# Patient Record
Sex: FEMALE | Race: WHITE | NOT HISPANIC OR LATINO | URBAN - METROPOLITAN AREA
[De-identification: names, ages, dates, MRNs, and addresses within clinical notes are randomized per-mention and may not be internally consistent; named-entity substitution may affect disease eponyms.]

---

## 2022-05-10 ENCOUNTER — TELEPHONE (OUTPATIENT)
Dept: TRANSPLANT | Facility: CLINIC | Age: 52
End: 2022-05-10

## 2022-05-10 NOTE — TELEPHONE ENCOUNTER
Completed BREEZE questionnaire reviewed with patient.  All questions were answered and patient would like to proceed with donation process.  Informed her that an independent living donor advocate will contact her within the next 2-3 business days.  After discussion with donor advocate, blood testing can be scheduled.  All questions were answered and patient verbalized understanding to all information provided.

## 2022-05-25 ENCOUNTER — TELEPHONE (OUTPATIENT)
Dept: TRANSPLANT | Facility: CLINIC | Age: 52
End: 2022-05-25

## 2022-05-25 NOTE — TELEPHONE ENCOUNTER
Spoke with patient regarding donor testing. HLA kit sent for crossmatch. Patient will wait for recipient to be approved before completing the evaluation.

## 2022-06-01 PROCEDURE — 81376 HLA II TYPING 1 LOCUS LR: CPT | Mod: 91,PO,TXP | Performed by: NURSE PRACTITIONER

## 2022-06-01 PROCEDURE — 81373 HLA I TYPING 1 LOCUS LR: CPT | Mod: 91,PO,TXP | Performed by: NURSE PRACTITIONER

## 2022-06-01 PROCEDURE — 81376 HLA II TYPING 1 LOCUS LR: CPT | Mod: PO,TXP | Performed by: NURSE PRACTITIONER

## 2022-06-01 PROCEDURE — 81373 HLA I TYPING 1 LOCUS LR: CPT | Mod: PO,TXP | Performed by: NURSE PRACTITIONER

## 2022-06-02 ENCOUNTER — LAB VISIT (OUTPATIENT)
Dept: LAB | Facility: HOSPITAL | Age: 52
End: 2022-06-02
Payer: COMMERCIAL

## 2022-06-02 ENCOUNTER — TELEPHONE (OUTPATIENT)
Dept: TRANSPLANT | Facility: CLINIC | Age: 52
End: 2022-06-02

## 2022-06-02 DIAGNOSIS — Z00.5 EXAMINATION OF POTENTIAL DONOR OF ORGAN AND TISSUE: ICD-10-CM

## 2022-06-02 LAB
ABO GROUP BLD: NORMAL
RH BLD: NORMAL

## 2022-06-02 PROCEDURE — 36415 COLL VENOUS BLD VENIPUNCTURE: CPT | Mod: TXP | Performed by: NURSE PRACTITIONER

## 2022-06-02 PROCEDURE — 86901 BLOOD TYPING SEROLOGIC RH(D): CPT | Mod: TXP | Performed by: NURSE PRACTITIONER

## 2022-06-08 ENCOUNTER — TELEPHONE (OUTPATIENT)
Dept: TRANSPLANT | Facility: CLINIC | Age: 52
End: 2022-06-08
Payer: COMMERCIAL

## 2022-06-08 NOTE — TELEPHONE ENCOUNTER
Patient notified that the results of the crossmatch with her friend show that they are compatible. Patient states she would like to proceed with the medical evaluation once the recipient has been approved for transplant. I will contact her once test can be scheduled. All questions were answered and patient verbalized understanding.

## 2022-07-06 LAB
HLA DQA1 1: NORMAL
HLA DQA1 2: NORMAL
HLA DRB4 1: NORMAL
HLA-A 1 SERO. EQUIV: 2
HLA-A 1: NORMAL
HLA-A 2 SERO. EQUIV: 3
HLA-A 2: NORMAL
HLA-B 1 SERO. EQUIV: 13
HLA-B 1: NORMAL
HLA-B 2 SERO. EQUIV: 51
HLA-B 2: NORMAL
HLA-BW 1 SERO. EQUIV: 4
HLA-BW 2 SERO. EQUIV: NORMAL
HLA-C 1: NORMAL
HLA-C 2: NORMAL
HLA-CW 1 SERO. EQUIV: 6
HLA-CW 2 SERO. EQUIV: 15
HLA-DPA1 1: NORMAL
HLA-DPA1 2: NORMAL
HLA-DPB1 1: NORMAL
HLA-DPB1 2: NORMAL
HLA-DQ 1 SERO. EQUIV: 8
HLA-DQ 2 SERO. EQUIV: 9
HLA-DQB1 1: NORMAL
HLA-DQB1 2: NORMAL
HLA-DRB1 1 SERO. EQUIV: 4
HLA-DRB1 1: NORMAL
HLA-DRB1 2 SERO. EQUIV: 9
HLA-DRB1 2: NORMAL
HLA-DRB3 1: NORMAL
HLA-DRB3 2: NORMAL
HLA-DRB345 1 SERO. EQUIV: 53
HLA-DRB345 2 SERO. EQUIV: NORMAL
HLA-DRB4 2: NORMAL
HLA-DRB5 1: NORMAL
HLA-DRB5 2: NORMAL
HLATY INTERPRETATION: NORMAL
SSALL INTERPRETATION: NORMAL
SSALL TESTING DATE: NORMAL
SSDPA TESTING DATE: NORMAL
SSDPB TESTING DATE: NORMAL
SSDQA TESTING DATE: NORMAL
SSDQB TESTING DATE: NORMAL
SSDRB TESTING DATE: NORMAL
SSOA TESTING DATE: NORMAL
SSOB TESTING DATE: NORMAL
SSOC TESTING DATE: NORMAL
SSODR TESTING DATE: NORMAL

## 2022-07-14 ENCOUNTER — TELEPHONE (OUTPATIENT)
Dept: TRANSPLANT | Facility: CLINIC | Age: 52
End: 2022-07-14
Payer: COMMERCIAL

## 2022-07-14 DIAGNOSIS — Z00.5 TRANSPLANT DONOR EVALUATION: Primary | ICD-10-CM

## 2022-07-14 NOTE — TELEPHONE ENCOUNTER
Patient states she would like to proceed with the medical evaluation. Required testing was discussed and information provided to schedule appointments.  Advised patient that she would need a care giver to donate following surgery.  She said she would work on that.  All questions were answered and patient verbalized understanding.

## 2022-08-12 ENCOUNTER — TELEPHONE (OUTPATIENT)
Dept: TRANSPLANT | Facility: CLINIC | Age: 52
End: 2022-08-12
Payer: COMMERCIAL

## 2022-08-25 ENCOUNTER — TELEPHONE (OUTPATIENT)
Dept: TRANSPLANT | Facility: CLINIC | Age: 52
End: 2022-08-25
Payer: COMMERCIAL

## 2022-09-21 ENCOUNTER — TELEPHONE (OUTPATIENT)
Dept: TRANSPLANT | Facility: CLINIC | Age: 52
End: 2022-09-21
Payer: COMMERCIAL

## 2022-09-21 NOTE — TELEPHONE ENCOUNTER
Spoke with patient regarding questions about donation.  Donor expressed anxiety about giving one of her kidneys due to her family history with kidney disease and diabetes.  Donor would like to start with 24 hour urine testing to check kidney function before doing the other parts of the evaluation.  Directions for 24 hour urine reviewed.  Lab order for [facility] and donation information book mailed to patient.  Donor says she will take sample to [facility] on 9/26 or 9/27 since she will be off of work those days.  All questions answered.

## 2022-10-10 ENCOUNTER — TELEPHONE (OUTPATIENT)
Dept: TRANSPLANT | Facility: CLINIC | Age: 52
End: 2022-10-10
Payer: COMMERCIAL

## 2022-10-10 NOTE — TELEPHONE ENCOUNTER
Received Labs from [facility].  Donor has decided she does not want to donate d/t concerns about family history with Kidney failure.  Recipient told that donor has protein in her urine, and that is why she is not a candidate for donation.  All questions answered.